# Patient Record
Sex: MALE | Race: WHITE | NOT HISPANIC OR LATINO | Employment: FULL TIME | URBAN - NONMETROPOLITAN AREA
[De-identification: names, ages, dates, MRNs, and addresses within clinical notes are randomized per-mention and may not be internally consistent; named-entity substitution may affect disease eponyms.]

---

## 2023-10-06 ENCOUNTER — APPOINTMENT (OUTPATIENT)
Dept: RADIOLOGY | Facility: HOSPITAL | Age: 53
End: 2023-10-06
Payer: COMMERCIAL

## 2023-10-06 ENCOUNTER — HOSPITAL ENCOUNTER (EMERGENCY)
Facility: HOSPITAL | Age: 53
Discharge: HOME | End: 2023-10-06
Attending: PHYSICIAN ASSISTANT
Payer: COMMERCIAL

## 2023-10-06 VITALS
HEART RATE: 72 BPM | SYSTOLIC BLOOD PRESSURE: 109 MMHG | TEMPERATURE: 96.5 F | DIASTOLIC BLOOD PRESSURE: 77 MMHG | BODY MASS INDEX: 25.18 KG/M2 | RESPIRATION RATE: 16 BRPM | WEIGHT: 190 LBS | HEIGHT: 73 IN | OXYGEN SATURATION: 98 %

## 2023-10-06 DIAGNOSIS — S93.402A SPRAIN OF LEFT ANKLE, UNSPECIFIED LIGAMENT, INITIAL ENCOUNTER: ICD-10-CM

## 2023-10-06 DIAGNOSIS — M25.561 ACUTE PAIN OF RIGHT KNEE: ICD-10-CM

## 2023-10-06 DIAGNOSIS — S93.402A SPRAIN OF LEFT ANKLE, INITIAL ENCOUNTER: Primary | ICD-10-CM

## 2023-10-06 DIAGNOSIS — S50.02XA CONTUSION OF LEFT ELBOW, INITIAL ENCOUNTER: ICD-10-CM

## 2023-10-06 PROCEDURE — 73562 X-RAY EXAM OF KNEE 3: CPT | Mod: RIGHT SIDE | Performed by: RADIOLOGY

## 2023-10-06 PROCEDURE — 99284 EMERGENCY DEPT VISIT MOD MDM: CPT | Mod: 25 | Performed by: PHYSICIAN ASSISTANT

## 2023-10-06 PROCEDURE — 73610 X-RAY EXAM OF ANKLE: CPT | Mod: RT,FY

## 2023-10-06 PROCEDURE — 73610 X-RAY EXAM OF ANKLE: CPT | Mod: RIGHT SIDE | Performed by: RADIOLOGY

## 2023-10-06 PROCEDURE — 73564 X-RAY EXAM KNEE 4 OR MORE: CPT | Mod: RT,FY

## 2023-10-06 PROCEDURE — 73080 X-RAY EXAM OF ELBOW: CPT | Mod: LEFT SIDE | Performed by: RADIOLOGY

## 2023-10-06 PROCEDURE — 82075 ASSAY OF BREATH ETHANOL: CPT

## 2023-10-06 PROCEDURE — 73080 X-RAY EXAM OF ELBOW: CPT | Mod: LT,FY

## 2023-10-06 RX ORDER — ETODOLAC 300 MG/1
300 CAPSULE ORAL 3 TIMES DAILY PRN
Qty: 30 CAPSULE | Refills: 0 | Status: SHIPPED | OUTPATIENT
Start: 2023-10-06 | End: 2023-10-16

## 2023-10-06 ASSESSMENT — COLUMBIA-SUICIDE SEVERITY RATING SCALE - C-SSRS
1. IN THE PAST MONTH, HAVE YOU WISHED YOU WERE DEAD OR WISHED YOU COULD GO TO SLEEP AND NOT WAKE UP?: NO
2. HAVE YOU ACTUALLY HAD ANY THOUGHTS OF KILLING YOURSELF?: NO
6. HAVE YOU EVER DONE ANYTHING, STARTED TO DO ANYTHING, OR PREPARED TO DO ANYTHING TO END YOUR LIFE?: NO

## 2023-10-06 ASSESSMENT — PAIN SCALES - GENERAL: PAINLEVEL_OUTOF10: 7

## 2023-10-06 ASSESSMENT — PAIN - FUNCTIONAL ASSESSMENT: PAIN_FUNCTIONAL_ASSESSMENT: 0-10

## 2023-10-06 NOTE — ED TRIAGE NOTES
Fell 2 feet backwards off of pallets at work, injuring left elbow, right knee, and right ankle. This incident happened at 0900 this morning, pt states he iced it and went back to work; pt reports no pain is worse

## 2023-10-06 NOTE — Clinical Note
Anibal Person was seen and treated in our emergency department on 10/6/2023.  He may return to work on 10/09/2023.       If you have any questions or concerns, please don't hesitate to call.      Noemí Hanley PA-C

## 2023-10-06 NOTE — ED PROVIDER NOTES
HPI   Chief Complaint   Patient presents with    Ankle Pain     right    Knee Pain     right    Elbow Pain     left       53-year-old male was at work today when he slipped off a pallet falling about 2 feet with right knee right ankle and left elbow pain    Did not happened about 9 AM this morning  He did work the rest of the day but with increasing pain to the areas after this                                No data recorded                Patient History   Past Medical History:   Diagnosis Date    Other hammer toe(s) (acquired), unspecified foot     Hammer toe     Past Surgical History:   Procedure Laterality Date    FOOT SURGERY  07/12/2013    Foot Surgery    FOOT SURGERY  07/12/2013    Foot Surgery    TYMPANOSTOMY TUBE PLACEMENT  07/12/2013    Ear Pressure Equalization Tube     No family history on file.  Social History     Tobacco Use    Smoking status: Not on file    Smokeless tobacco: Not on file   Substance Use Topics    Alcohol use: Not on file    Drug use: Not on file       Physical Exam   ED Triage Vitals [10/06/23 1538]   Temp Heart Rate Resp BP   35.8 °C (96.5 °F) 72 16 109/77      SpO2 Temp src Heart Rate Source Patient Position   98 % -- -- --      BP Location FiO2 (%)     -- --       Physical Exam  Vitals and nursing note reviewed.   Constitutional:       General: He is not in acute distress.     Appearance: He is well-developed.   HENT:      Head: Normocephalic and atraumatic.   Eyes:      Conjunctiva/sclera: Conjunctivae normal.   Cardiovascular:      Rate and Rhythm: Normal rate and regular rhythm.      Heart sounds: No murmur heard.  Pulmonary:      Effort: Pulmonary effort is normal. No respiratory distress.      Breath sounds: Normal breath sounds.   Abdominal:      Palpations: Abdomen is soft.      Tenderness: There is no abdominal tenderness.   Musculoskeletal:         General: No swelling.      Cervical back: Neck supple.      Comments: Right ankle pain with palp but no swelling or  ecchymosis  Anterior knee pain but no swelling or ecchymosis  But tenderness with flexion or extension and no joint laxity    Left elbow medial epicondyles slight tenderness  But no ecchymosis or swelling   Skin:     General: Skin is warm and dry.      Capillary Refill: Capillary refill takes less than 2 seconds.   Neurological:      Mental Status: He is alert.   Psychiatric:         Mood and Affect: Mood normal.         ED Course & MDM   Diagnoses as of 10/06/23 1757   Sprain of left ankle, initial encounter   Acute pain of right knee   Contusion of left elbow, initial encounter       Medical Decision Making  X-ray of the right ankle and knee no fractures or dislocation x-ray of the left elbow no fractures or dislocations no fat pad sign    Exam no swelling or ecchymosis to the areas Ace wrap applied to the right ankle will discharge on anti-inflammatory medications          Procedure  Procedures     Noemí Hanley PA-C  10/06/23 1800       Noemí Hanley PA-C  10/06/23 1928

## 2023-10-06 NOTE — DISCHARGE INSTRUCTIONS
X-rays of the right ankle knee and left elbow are negative for any fractures  right ankle sprain rest ice compression and elevation

## 2025-01-09 ENCOUNTER — APPOINTMENT (OUTPATIENT)
Dept: RADIOLOGY | Facility: HOSPITAL | Age: 55
End: 2025-01-09
Payer: COMMERCIAL

## 2025-01-09 ENCOUNTER — HOSPITAL ENCOUNTER (EMERGENCY)
Facility: HOSPITAL | Age: 55
Discharge: HOME | End: 2025-01-09
Attending: EMERGENCY MEDICINE
Payer: COMMERCIAL

## 2025-01-09 VITALS
OXYGEN SATURATION: 100 % | SYSTOLIC BLOOD PRESSURE: 145 MMHG | DIASTOLIC BLOOD PRESSURE: 92 MMHG | BODY MASS INDEX: 27.83 KG/M2 | WEIGHT: 210 LBS | HEART RATE: 78 BPM | RESPIRATION RATE: 16 BRPM | TEMPERATURE: 97.4 F | HEIGHT: 73 IN

## 2025-01-09 DIAGNOSIS — S60.419A ABRASION OF FINGER, INITIAL ENCOUNTER: Primary | ICD-10-CM

## 2025-01-09 DIAGNOSIS — L03.012 CELLULITIS OF FINGER OF LEFT HAND: ICD-10-CM

## 2025-01-09 PROCEDURE — 2500000004 HC RX 250 GENERAL PHARMACY W/ HCPCS (ALT 636 FOR OP/ED): Performed by: EMERGENCY MEDICINE

## 2025-01-09 PROCEDURE — 99284 EMERGENCY DEPT VISIT MOD MDM: CPT | Performed by: EMERGENCY MEDICINE

## 2025-01-09 PROCEDURE — 73130 X-RAY EXAM OF HAND: CPT | Mod: LT

## 2025-01-09 PROCEDURE — 99283 EMERGENCY DEPT VISIT LOW MDM: CPT | Mod: 25 | Performed by: EMERGENCY MEDICINE

## 2025-01-09 PROCEDURE — 90715 TDAP VACCINE 7 YRS/> IM: CPT | Performed by: EMERGENCY MEDICINE

## 2025-01-09 PROCEDURE — 2500000001 HC RX 250 WO HCPCS SELF ADMINISTERED DRUGS (ALT 637 FOR MEDICARE OP): Performed by: EMERGENCY MEDICINE

## 2025-01-09 PROCEDURE — 90471 IMMUNIZATION ADMIN: CPT | Performed by: EMERGENCY MEDICINE

## 2025-01-09 PROCEDURE — 73130 X-RAY EXAM OF HAND: CPT | Mod: LEFT SIDE | Performed by: RADIOLOGY

## 2025-01-09 RX ORDER — DOXYCYCLINE 100 MG/1
100 TABLET ORAL 2 TIMES DAILY
Qty: 14 TABLET | Refills: 0 | Status: SHIPPED | OUTPATIENT
Start: 2025-01-09 | End: 2025-01-16

## 2025-01-09 RX ORDER — DOXYCYCLINE HYCLATE 50 MG/1
100 CAPSULE, GELATIN COATED ORAL ONCE
Status: COMPLETED | OUTPATIENT
Start: 2025-01-09 | End: 2025-01-09

## 2025-01-09 RX ADMIN — DOXYCYCLINE HYCLATE 100 MG: 50 CAPSULE ORAL at 03:07

## 2025-01-09 RX ADMIN — TETANUS TOXOID, REDUCED DIPHTHERIA TOXOID AND ACELLULAR PERTUSSIS VACCINE, ADSORBED 0.5 ML: 5; 2.5; 8; 8; 2.5 SUSPENSION INTRAMUSCULAR at 03:07

## 2025-01-09 ASSESSMENT — PAIN DESCRIPTION - ORIENTATION: ORIENTATION: LEFT

## 2025-01-09 ASSESSMENT — PAIN DESCRIPTION - LOCATION: LOCATION: HAND

## 2025-01-09 ASSESSMENT — PAIN SCALES - GENERAL: PAINLEVEL_OUTOF10: 9

## 2025-01-09 ASSESSMENT — PAIN - FUNCTIONAL ASSESSMENT: PAIN_FUNCTIONAL_ASSESSMENT: 0-10

## 2025-01-09 ASSESSMENT — PAIN DESCRIPTION - PAIN TYPE: TYPE: ACUTE PAIN

## 2025-01-09 NOTE — ED PROVIDER NOTES
HPI   Chief Complaint   Patient presents with    Hand Pain     Left ring finger injury       HPI  Patient is a 54-year-old male presenting to the ED today for an injury to his left ring finger.  Patient explains that he was at work yesterday when his left ring finger got smacked by a piece of wood sticking out of the pallet.  He states that this occurred around 4 PM yesterday. Since then, he has noticed worsening pain and swelling of the finger, with some associated redness, prompting him to come to the ED for further management.  He states his last tetanus shot was over 8 years ago. He has no additional concerns.      Patient History   Past Medical History:   Diagnosis Date    Other hammer toe(s) (acquired), unspecified foot     Hammer toe     Past Surgical History:   Procedure Laterality Date    FOOT SURGERY  07/12/2013    Foot Surgery    FOOT SURGERY  07/12/2013    Foot Surgery    TYMPANOSTOMY TUBE PLACEMENT  07/12/2013    Ear Pressure Equalization Tube     No family history on file.  Social History     Tobacco Use    Smoking status: Never    Smokeless tobacco: Never   Vaping Use    Vaping status: Every Day   Substance Use Topics    Alcohol use: Never    Drug use: Never       Physical Exam   ED Triage Vitals [01/09/25 0145]   Temperature Heart Rate Respirations BP   36.3 °C (97.4 °F) 80 20 (!) 150/102      Pulse Ox Temp Source Heart Rate Source Patient Position   99 % Temporal -- --      BP Location FiO2 (%)     -- --       Physical Exam  Vitals and nursing note reviewed.   Constitutional:       General: He is not in acute distress.     Appearance: He is not toxic-appearing.   HENT:      Head: Normocephalic.   Eyes:      Conjunctiva/sclera: Conjunctivae normal.   Cardiovascular:      Pulses: Normal pulses.   Pulmonary:      Effort: Pulmonary effort is normal. No respiratory distress.   Musculoskeletal:      Cervical back: Neck supple.      Comments: Superficial abrasion to the dorsal aspect of the left ring  finger.  There is mild surrounding erythema and swelling.  Finger is held in extension.  He is able to flex the finger, but with exacerbation of pain.  No swelling over the volar aspect.   Skin:     General: Skin is warm and dry.      Capillary Refill: Capillary refill takes less than 2 seconds.   Neurological:      General: No focal deficit present.      Mental Status: He is alert. Mental status is at baseline.           ED Course & MDM   Diagnoses as of 01/09/25 0309   Abrasion of finger, initial encounter   Cellulitis of finger of left hand             No data recorded     Ira Coma Scale Score: 15 (01/09/25 0147 : Ely Parker RN)                       Medical Decision Making  Patient was seen and evaluated for an injury to his left ring finger.  X-ray is ordered for further evaluation.  Patient is also administered Tdap here in the ED.  On exam, patient has mild swelling and erythema over the dorsal aspect where there is an abrasion present.  He otherwise has negative Kanavel signs, and exam is not consistent with flexor tenosynovitis.     XR hand left 3+ views   Final Result   Unremarkable hand.   Signed by Melchor Causey DO        Patient was informed of their imaging results, and all questions and concerns were answered.  Patient will be started on doxycycline for cellulitis/further infection prevention.  Discharge planning with close outpatient follow-up was discussed at this time, to which the patient was agreeable. Strict return precautions were given, and patient was discharged home in stable condition.    Procedure  Procedures     Ellie MITCHELL MD  01/09/25 7947

## 2025-04-03 ENCOUNTER — TELEPHONE (OUTPATIENT)
Dept: SURGERY | Facility: CLINIC | Age: 55
End: 2025-04-03
Payer: COMMERCIAL

## 2025-04-03 NOTE — TELEPHONE ENCOUNTER
spoke with Pt. and made an appointment for Pt. Pt. is aware that this would not be under workmans comp. it would only by ubder his [employer] insurance

## 2025-04-11 ENCOUNTER — APPOINTMENT (OUTPATIENT)
Dept: SURGERY | Facility: CLINIC | Age: 55
End: 2025-04-11
Payer: COMMERCIAL

## 2025-04-11 VITALS
WEIGHT: 195 LBS | DIASTOLIC BLOOD PRESSURE: 89 MMHG | SYSTOLIC BLOOD PRESSURE: 147 MMHG | HEIGHT: 73 IN | TEMPERATURE: 98.8 F | BODY MASS INDEX: 25.84 KG/M2 | HEART RATE: 70 BPM

## 2025-04-11 DIAGNOSIS — K40.90 RIGHT INGUINAL HERNIA: Primary | ICD-10-CM

## 2025-04-11 PROCEDURE — 1036F TOBACCO NON-USER: CPT | Performed by: SURGERY

## 2025-04-11 PROCEDURE — 3008F BODY MASS INDEX DOCD: CPT | Performed by: SURGERY

## 2025-04-11 PROCEDURE — 99204 OFFICE O/P NEW MOD 45 MIN: CPT | Performed by: SURGERY

## 2025-04-11 RX ORDER — ACETAMINOPHEN 325 MG/1
975 TABLET ORAL ONCE
OUTPATIENT
Start: 2025-04-11 | End: 2025-04-11

## 2025-04-11 RX ORDER — CELECOXIB 50 MG/1
200 CAPSULE ORAL ONCE
OUTPATIENT
Start: 2025-04-11 | End: 2025-04-11

## 2025-04-11 NOTE — PATIENT INSTRUCTIONS
You have a symptomatic right groin bulge.  We will repair your hernia for you on April 29, 2025.  My office will provide you preprocedure instructions.  You have been enrolled in the abdominal core health quality collaborative and the no opioids trial

## 2025-04-11 NOTE — PROGRESS NOTES
Subjective   Patient ID: Anibal Person is a 54 y.o. male who presents for symptomatic right groin bulge    HPI   This patient has had a groin bulge on the right side for the past 2 years.  Is been increasing in size.  He has pain and discomfort with a bulge.  He is able to push it back in.  He wishes to have this repaired based on the increasing symptoms  Past Medical History:   Diagnosis Date    Other hammer toe(s) (acquired), unspecified foot     Hammer toe        No current outpatient medications on file prior to visit.     No current facility-administered medications on file prior to visit.        Review of Systems   All other systems reviewed and are negative.      Vitals:    04/11/25 0807   BP: 147/89   Pulse: 70   Temp: 37.1 °C (98.8 °F)        Objective     Physical Exam  Vitals reviewed.   Constitutional:       Appearance: Normal appearance.   HENT:      Head: Normocephalic.   Cardiovascular:      Rate and Rhythm: Normal rate and regular rhythm.      Heart sounds: Normal heart sounds.   Pulmonary:      Effort: Pulmonary effort is normal.      Breath sounds: Normal breath sounds.   Abdominal:      General: Abdomen is flat. There is no distension.      Palpations: Abdomen is soft. There is no mass.      Tenderness: There is no abdominal tenderness. There is no guarding.      Hernia: A hernia is present. Hernia is present in the right inguinal area.      Comments: Reducible right inguinal hernia   Musculoskeletal:         General: Normal range of motion.   Skin:     General: Skin is warm.   Neurological:      General: No focal deficit present.   Psychiatric:         Mood and Affect: Mood normal.         Problem List Items Addressed This Visit       Right inguinal hernia - Primary        Assessment/Plan   RIGHT INGUINAL HERNIORRAPHY 4.29.25.  Risks include, but not limited to acute and chronic pain,  infection,bleeding, risk of recurrence, risk of numbness, risk of cardiac, renal, pulmonary, neurologic,  locomotor,  anesthetic event, risk of bowel injury, other unforeseen complications including death.    Patient enrolled in the Abdominal Core Health Quality Collaborative /NOOPIOIDS trial     Jose Ivy MD

## 2025-04-14 ENCOUNTER — ANESTHESIA EVENT (OUTPATIENT)
Dept: OPERATING ROOM | Facility: HOSPITAL | Age: 55
End: 2025-04-14
Payer: COMMERCIAL

## 2025-04-14 NOTE — ANESTHESIA PREPROCEDURE EVALUATION
"Patient: Anibal Person    Procedure Information       Date/Time: 04/29/25 1130    Procedure: REPAIR, HERNIA, INGUINAL (Right)    Location: [facility] / [facility]    Surgeons: Jose Ivy MD            Relevant Problems   Anesthesia (within normal limits)      Cardiac (within normal limits)      Pulmonary (within normal limits)      Neuro (within normal limits)      GI (within normal limits)      /Renal (within normal limits)      Liver (within normal limits)      Endocrine (within normal limits)      Hematology (within normal limits)      Musculoskeletal (within normal limits)      HEENT (within normal limits)      ID (within normal limits)      Skin (within normal limits)      GYN (within normal limits)      Digestive   (+) Right inguinal hernia     There were no vitals filed for this visit.    Past Surgical History:   Procedure Laterality Date    FOOT SURGERY  07/12/2013    Foot Surgery    FOOT SURGERY  07/12/2013    Foot Surgery    TYMPANOSTOMY TUBE PLACEMENT  07/12/2013    Ear Pressure Equalization Tube     Past Medical History:   Diagnosis Date    Other hammer toe(s) (acquired), unspecified foot     Hammer toe     No current facility-administered medications for this encounter.  No current outpatient medications on file.  Prior to Admission medications    Not on File     Allergies   Allergen Reactions    Penicillins Unknown     Social History     Tobacco Use    Smoking status: Never    Smokeless tobacco: Never   Substance Use Topics    Alcohol use: Never         Chemistry    No results found for: \"NA\", \"K\", \"CL\", \"CO2\", \"BUN\", \"CREATININE\", \"GLU\" No results found for: \"CALCIUM\", \"ALKPHOS\", \"AST\", \"ALT\", \"BILITOT\"       No results found for: \"WBC\", \"HGB\", \"HCT\", \"PLT\"  No results found for: \"PROTIME\", \"PTT\", \"INR\"  No results found for this or any previous visit (from the past 4464 hours).  No results found for this or any previous visit from the past 1095 days.    Clinical information reviewed:      "            Chart reviewed.  No clearances ordered.    NPO Detail:  No data recorded     Physical Exam    Airway  Mallampati: I  TM distance: >3 FB  Neck ROM: full  Mouth opening: 3 or more finger widths     Cardiovascular - normal exam  Rhythm: regular     Dental - normal exam     Pulmonary - normal examBreath sounds clear to auscultation     Abdominal - normal examAbdomen: soft  Bowel sounds: normal           Anesthesia Plan    History of general anesthesia?: yes  History of complications of general anesthesia?: no    ASA 2     general     The patient is a current smoker.  Patient was previously instructed to abstain from smoking on day of procedure.  Patient smoked on day of procedure.    intravenous induction   Postoperative pain plan includes opioids.  Trial extubation is planned.  Anesthetic plan and risks discussed with patient.  Use of blood products discussed with patient who consented to blood products.    Plan discussed with CRNA.

## 2025-04-15 ENCOUNTER — PRE-ADMISSION TESTING (OUTPATIENT)
Dept: PREADMISSION TESTING | Facility: HOSPITAL | Age: 55
End: 2025-04-15
Payer: COMMERCIAL

## 2025-04-15 DIAGNOSIS — Z29.9 PROPHYLACTIC MEASURE: Primary | ICD-10-CM

## 2025-04-15 PROCEDURE — 87081 CULTURE SCREEN ONLY: CPT | Mod: GENLAB

## 2025-04-15 RX ORDER — CHLORHEXIDINE GLUCONATE 40 MG/ML
SOLUTION TOPICAL
Qty: 473 ML | Refills: 0 | Status: SHIPPED | OUTPATIENT
Start: 2025-04-15

## 2025-04-15 RX ORDER — CHLORHEXIDINE GLUCONATE ORAL RINSE 1.2 MG/ML
SOLUTION DENTAL
Qty: 120 ML | Refills: 0 | Status: SHIPPED | OUTPATIENT
Start: 2025-04-15

## 2025-04-15 ASSESSMENT — DUKE ACTIVITY SCORE INDEX (DASI)
CAN YOU DO YARD WORK LIKE RAKING LEAVES, WEEDING OR PUSHING A MOWER: YES
TOTAL_SCORE: 58.2
CAN YOU DO HEAVY WORK AROUND THE HOUSE LIKE SCRUBBING FLOORS OR LIFTING AND MOVING HEAVY FURNITURE: YES
CAN YOU WALK A BLOCK OR TWO ON LEVEL GROUND: YES
DASI METS SCORE: 9.9
CAN YOU CLIMB A FLIGHT OF STAIRS OR WALK UP A HILL: YES
CAN YOU TAKE CARE OF YOURSELF (EAT, DRESS, BATHE, OR USE TOILET): YES
CAN YOU RUN A SHORT DISTANCE: YES
CAN YOU WALK INDOORS, SUCH AS AROUND YOUR HOUSE: YES
CAN YOU PARTICIPATE IN STRENOUS SPORTS LIKE SWIMMING, SINGLES TENNIS, FOOTBALL, BASKETBALL, OR SKIING: YES
CAN YOU DO LIGHT WORK AROUND THE HOUSE LIKE DUSTING OR WASHING DISHES: YES
CAN YOU DO MODERATE WORK AROUND THE HOUSE LIKE VACUUMING, SWEEPING FLOORS OR CARRYING GROCERIES: YES
CAN YOU HAVE SEXUAL RELATIONS: YES
CAN YOU PARTICIPATE IN MODERATE RECREATIONAL ACTIVITIES LIKE GOLF, BOWLING, DANCING, DOUBLES TENNIS OR THROWING A BASEBALL OR FOOTBALL: YES

## 2025-04-15 NOTE — PREPROCEDURE INSTRUCTIONS
Medication List      as of April 15, 2025  7:17 AM     You have not been prescribed any medications.         NPO Instructions:    NO SOLID FOOD OR DAIRY PRODUCTS  after midnight the night prior to your procedure-ONLY CLEAR LIQUIDS     Examples of clear liquids permitted the day before your procedure: black coffee or coffee with sugar, broth, popsicles, Gatorade or Powerade, Italian ice, Jello, crystal light drink mixes, or apple juice with no pulp (no citrus juices)     You may have clear liquids, medications, gum and hard candy up to THREE HOURS prior to your procedure- General rule is to not put ANYTHING AT ALL in your mouth after this time     Additional Instructions:     We will call you the business day before your procedure between 1-3p to confirm your procedure and arrival time     Please park in the back of the hospital, in the ED parking and proceed to the second floor. This is through the ED waiting room and take the elevator to the second floor. When you get off the elevator you will check in the OUTPATIENT department on the left.     Please keep in mind the construction on [address]     Please make arrangements to have a responsible adult take you home and stay with you. NO DRIVING FOR 24 HOURS.     Please bring your ID and insurance card to your procedure     It is anesthesia's policy to not use any marijuana, CBD or THC products 24 hours prior to procedure. This is due to not knowing how the products will react with anesthesia.     If you get ill at all before your procedure- CALL YOUR DOCTOR/SURGEON. We want you in the best shape that is possible. Any sickness might lead to your procedure being delayed.     Please shower or bathe the morning of your procedure with antibacterial soap. You may receive instructions for Hibiclens shower.     Shower as you normally would do     Use soap from neck down, focusing on area that is having the surgery (avoid eyes and genitals)     Turn water off and let dry  for three minutes.     Turn water back on and rinse off     When checked in to the outpatient unit, you will be asked to change into a hospital gown and remove all clothing, including underwear     An IV will be inserted      Your family or  will be asked to wait in the waiting room or cafeteria and will be notified when able to come sit with you     Typical recovery time will be aprox 60 minutes in PACU. Please let us know if you are having pain or nausea so we can assist you.     Please call [phone] with any further questions

## 2025-04-17 LAB — STAPHYLOCOCCUS SPEC CULT: NORMAL

## 2025-04-29 ENCOUNTER — HOSPITAL ENCOUNTER (OUTPATIENT)
Facility: HOSPITAL | Age: 55
Setting detail: OUTPATIENT SURGERY
Discharge: HOME | End: 2025-04-29
Attending: SURGERY | Admitting: SURGERY
Payer: COMMERCIAL

## 2025-04-29 ENCOUNTER — ANESTHESIA (OUTPATIENT)
Dept: OPERATING ROOM | Facility: HOSPITAL | Age: 55
End: 2025-04-29
Payer: COMMERCIAL

## 2025-04-29 VITALS
SYSTOLIC BLOOD PRESSURE: 134 MMHG | TEMPERATURE: 98.4 F | WEIGHT: 195 LBS | HEIGHT: 73 IN | RESPIRATION RATE: 16 BRPM | OXYGEN SATURATION: 99 % | BODY MASS INDEX: 25.84 KG/M2 | DIASTOLIC BLOOD PRESSURE: 86 MMHG | HEART RATE: 60 BPM

## 2025-04-29 DIAGNOSIS — K40.90 RIGHT INGUINAL HERNIA: Primary | ICD-10-CM

## 2025-04-29 PROCEDURE — 2500000004 HC RX 250 GENERAL PHARMACY W/ HCPCS (ALT 636 FOR OP/ED): Performed by: SURGERY

## 2025-04-29 PROCEDURE — 3600000003 HC OR TIME - INITIAL BASE CHARGE - PROCEDURE LEVEL THREE: Performed by: SURGERY

## 2025-04-29 PROCEDURE — 2780000003 HC OR 278 NO HCPCS: Performed by: SURGERY

## 2025-04-29 PROCEDURE — 49505 PRP I/HERN INIT REDUC >5 YR: CPT | Performed by: SURGERY

## 2025-04-29 PROCEDURE — 7100000001 HC RECOVERY ROOM TIME - INITIAL BASE CHARGE: Performed by: SURGERY

## 2025-04-29 PROCEDURE — 2500000005 HC RX 250 GENERAL PHARMACY W/O HCPCS: Performed by: PHYSICIAN ASSISTANT

## 2025-04-29 PROCEDURE — 7100000010 HC PHASE TWO TIME - EACH INCREMENTAL 1 MINUTE: Performed by: SURGERY

## 2025-04-29 PROCEDURE — 7100000002 HC RECOVERY ROOM TIME - EACH INCREMENTAL 1 MINUTE: Performed by: SURGERY

## 2025-04-29 PROCEDURE — 3700000002 HC GENERAL ANESTHESIA TIME - EACH INCREMENTAL 1 MINUTE: Performed by: SURGERY

## 2025-04-29 PROCEDURE — 3700000001 HC GENERAL ANESTHESIA TIME - INITIAL BASE CHARGE: Performed by: SURGERY

## 2025-04-29 PROCEDURE — 2500000004 HC RX 250 GENERAL PHARMACY W/ HCPCS (ALT 636 FOR OP/ED): Mod: JZ

## 2025-04-29 PROCEDURE — 2720000007 HC OR 272 NO HCPCS: Performed by: SURGERY

## 2025-04-29 PROCEDURE — 7100000009 HC PHASE TWO TIME - INITIAL BASE CHARGE: Performed by: SURGERY

## 2025-04-29 PROCEDURE — 2500000001 HC RX 250 WO HCPCS SELF ADMINISTERED DRUGS (ALT 637 FOR MEDICARE OP): Performed by: SURGERY

## 2025-04-29 PROCEDURE — 3600000008 HC OR TIME - EACH INCREMENTAL 1 MINUTE - PROCEDURE LEVEL THREE: Performed by: SURGERY

## 2025-04-29 PROCEDURE — C1781 MESH (IMPLANTABLE): HCPCS | Performed by: SURGERY

## 2025-04-29 PROCEDURE — 88302 TISSUE EXAM BY PATHOLOGIST: CPT | Mod: TC,GENLAB | Performed by: SURGERY

## 2025-04-29 DEVICE — BARD SOFT MESH, 3" X 6" (7.5 CM X 15 CM)
Type: IMPLANTABLE DEVICE | Site: GROIN | Status: FUNCTIONAL
Brand: BARD

## 2025-04-29 RX ORDER — ONDANSETRON HYDROCHLORIDE 2 MG/ML
INJECTION, SOLUTION INTRAVENOUS AS NEEDED
Status: DISCONTINUED | OUTPATIENT
Start: 2025-04-29 | End: 2025-04-29

## 2025-04-29 RX ORDER — IBUPROFEN 800 MG/1
800 TABLET ORAL EVERY 8 HOURS PRN
Qty: 9 TABLET | Refills: 0 | Status: SHIPPED | OUTPATIENT
Start: 2025-04-29 | End: 2025-05-02

## 2025-04-29 RX ORDER — BUPIVACAINE HYDROCHLORIDE 5 MG/ML
INJECTION, SOLUTION EPIDURAL; INTRACAUDAL; PERINEURAL AS NEEDED
Status: DISCONTINUED | OUTPATIENT
Start: 2025-04-29 | End: 2025-04-29 | Stop reason: HOSPADM

## 2025-04-29 RX ORDER — LIDOCAINE HYDROCHLORIDE 20 MG/ML
INJECTION, SOLUTION INFILTRATION; PERINEURAL AS NEEDED
Status: DISCONTINUED | OUTPATIENT
Start: 2025-04-29 | End: 2025-04-29

## 2025-04-29 RX ORDER — ACETAMINOPHEN 500 MG
1000 TABLET ORAL EVERY 8 HOURS PRN
Qty: 18 TABLET | Refills: 0 | Status: SHIPPED | OUTPATIENT
Start: 2025-04-29 | End: 2025-05-02

## 2025-04-29 RX ORDER — CELECOXIB 100 MG/1
200 CAPSULE ORAL ONCE
Status: COMPLETED | OUTPATIENT
Start: 2025-04-29 | End: 2025-04-29

## 2025-04-29 RX ORDER — ONDANSETRON HYDROCHLORIDE 2 MG/ML
4 INJECTION, SOLUTION INTRAVENOUS ONCE AS NEEDED
Status: DISCONTINUED | OUTPATIENT
Start: 2025-04-29 | End: 2025-04-29 | Stop reason: HOSPADM

## 2025-04-29 RX ORDER — KETOROLAC TROMETHAMINE 30 MG/ML
INJECTION, SOLUTION INTRAMUSCULAR; INTRAVENOUS AS NEEDED
Status: DISCONTINUED | OUTPATIENT
Start: 2025-04-29 | End: 2025-04-29

## 2025-04-29 RX ORDER — LIDOCAINE HYDROCHLORIDE 10 MG/ML
INJECTION, SOLUTION INFILTRATION; PERINEURAL AS NEEDED
Status: DISCONTINUED | OUTPATIENT
Start: 2025-04-29 | End: 2025-04-29 | Stop reason: HOSPADM

## 2025-04-29 RX ORDER — FENTANYL CITRATE 50 UG/ML
25 INJECTION, SOLUTION INTRAMUSCULAR; INTRAVENOUS EVERY 5 MIN PRN
Status: DISCONTINUED | OUTPATIENT
Start: 2025-04-29 | End: 2025-04-29 | Stop reason: HOSPADM

## 2025-04-29 RX ORDER — CLINDAMYCIN PHOSPHATE 600 MG/50ML
600 INJECTION, SOLUTION INTRAVENOUS ONCE
Status: COMPLETED | OUTPATIENT
Start: 2025-04-29 | End: 2025-04-29

## 2025-04-29 RX ORDER — ACETAMINOPHEN 325 MG/1
650 TABLET ORAL EVERY 4 HOURS PRN
Status: DISCONTINUED | OUTPATIENT
Start: 2025-04-29 | End: 2025-04-29 | Stop reason: HOSPADM

## 2025-04-29 RX ORDER — PROPOFOL 10 MG/ML
INJECTION, EMULSION INTRAVENOUS AS NEEDED
Status: DISCONTINUED | OUTPATIENT
Start: 2025-04-29 | End: 2025-04-29

## 2025-04-29 RX ORDER — ACETAMINOPHEN 325 MG/1
975 TABLET ORAL ONCE
Status: COMPLETED | OUTPATIENT
Start: 2025-04-29 | End: 2025-04-29

## 2025-04-29 RX ORDER — FENTANYL CITRATE 50 UG/ML
INJECTION, SOLUTION INTRAMUSCULAR; INTRAVENOUS AS NEEDED
Status: DISCONTINUED | OUTPATIENT
Start: 2025-04-29 | End: 2025-04-29

## 2025-04-29 RX ORDER — OXYCODONE HYDROCHLORIDE 5 MG/1
5 TABLET ORAL EVERY 4 HOURS PRN
Status: DISCONTINUED | OUTPATIENT
Start: 2025-04-29 | End: 2025-04-29 | Stop reason: HOSPADM

## 2025-04-29 RX ORDER — SODIUM CHLORIDE, SODIUM LACTATE, POTASSIUM CHLORIDE, CALCIUM CHLORIDE 600; 310; 30; 20 MG/100ML; MG/100ML; MG/100ML; MG/100ML
INJECTION, SOLUTION INTRAVENOUS CONTINUOUS PRN
Status: DISCONTINUED | OUTPATIENT
Start: 2025-04-29 | End: 2025-04-29

## 2025-04-29 RX ORDER — MIDAZOLAM HYDROCHLORIDE 1 MG/ML
INJECTION INTRAMUSCULAR; INTRAVENOUS AS NEEDED
Status: DISCONTINUED | OUTPATIENT
Start: 2025-04-29 | End: 2025-04-29

## 2025-04-29 RX ADMIN — LIDOCAINE HYDROCHLORIDE 80 MG: 20 INJECTION, SOLUTION INFILTRATION; PERINEURAL at 10:19

## 2025-04-29 RX ADMIN — ACETAMINOPHEN 975 MG: 325 TABLET, FILM COATED ORAL at 09:20

## 2025-04-29 RX ADMIN — FENTANYL CITRATE 50 MCG: 50 INJECTION, SOLUTION INTRAMUSCULAR; INTRAVENOUS at 10:27

## 2025-04-29 RX ADMIN — CELECOXIB 200 MG: 100 CAPSULE ORAL at 09:20

## 2025-04-29 RX ADMIN — POVIDONE-IODINE 1 APPLICATION: 5 SOLUTION TOPICAL at 10:02

## 2025-04-29 RX ADMIN — ONDANSETRON 4 MG: 2 INJECTION, SOLUTION INTRAMUSCULAR; INTRAVENOUS at 10:35

## 2025-04-29 RX ADMIN — KETOROLAC TROMETHAMINE 15 MG: 30 INJECTION, SOLUTION INTRAMUSCULAR; INTRAVENOUS at 10:35

## 2025-04-29 RX ADMIN — DEXAMETHASONE SODIUM PHOSPHATE 4 MG: 4 INJECTION, SOLUTION INTRAMUSCULAR; INTRAVENOUS at 10:22

## 2025-04-29 RX ADMIN — SODIUM CHLORIDE, POTASSIUM CHLORIDE, SODIUM LACTATE AND CALCIUM CHLORIDE: 600; 310; 30; 20 INJECTION, SOLUTION INTRAVENOUS at 10:14

## 2025-04-29 RX ADMIN — FENTANYL CITRATE 50 MCG: 50 INJECTION, SOLUTION INTRAMUSCULAR; INTRAVENOUS at 11:30

## 2025-04-29 RX ADMIN — MIDAZOLAM HYDROCHLORIDE 2 MG: 1 INJECTION, SOLUTION INTRAMUSCULAR; INTRAVENOUS at 10:14

## 2025-04-29 RX ADMIN — PROPOFOL 200 MG: 10 INJECTION, EMULSION INTRAVENOUS at 10:19

## 2025-04-29 RX ADMIN — CLINDAMYCIN IN 5 PERCENT DEXTROSE 600 MG: 12 INJECTION, SOLUTION INTRAVENOUS at 10:14

## 2025-04-29 SDOH — HEALTH STABILITY: MENTAL HEALTH: CURRENT SMOKER: 1

## 2025-04-29 ASSESSMENT — PAIN SCALES - GENERAL
PAIN_LEVEL: 0
PAINLEVEL_OUTOF10: 0 - NO PAIN
PAINLEVEL_OUTOF10: 2
PAINLEVEL_OUTOF10: 0 - NO PAIN

## 2025-04-29 ASSESSMENT — PAIN - FUNCTIONAL ASSESSMENT
PAIN_FUNCTIONAL_ASSESSMENT: 0-10

## 2025-04-29 ASSESSMENT — COLUMBIA-SUICIDE SEVERITY RATING SCALE - C-SSRS
6. HAVE YOU EVER DONE ANYTHING, STARTED TO DO ANYTHING, OR PREPARED TO DO ANYTHING TO END YOUR LIFE?: NO
1. IN THE PAST MONTH, HAVE YOU WISHED YOU WERE DEAD OR WISHED YOU COULD GO TO SLEEP AND NOT WAKE UP?: NO
2. HAVE YOU ACTUALLY HAD ANY THOUGHTS OF KILLING YOURSELF?: NO

## 2025-04-29 NOTE — LETTER
April 29, 2025     Patient: Anibal Person   YOB: 1970   Date of Visit: 4/11/2025       To Whom It May Concern:    It is my medical opinion that Anibal Person may return to light duty immediately with the following restrictions: no lifting over 20 lbs until follow up appointment with Dr. Ivy on June 2nd .    If you have any questions or concerns, please don't hesitate to call.         Sincerely,    Maciej Hong PA-C

## 2025-04-29 NOTE — OP NOTE
REPAIR, HERNIA, INGUINAL (R) Operative Note     Date: 2025  OR Location: [facility]    Name: Anibal Person, : 1970, Age: 54 y.o., MRN: [id number], Sex: male    Diagnosis  Pre-op Diagnosis      * Right inguinal hernia [K40.90] Post-op Diagnosis     * Right inguinal hernia [K40.90]     Procedures  REPAIR, HERNIA, INGUINAL  [id number] - CA RPR 1ST INGUN HRNA AGE 5 YRS/> REDUCIBLE      Surgeons      * Jose Ivy - Primary    Resident/Fellow/Other Assistant:  Surgeons and Role:     * Maciej Hong PA-C - Assisting    Staff:   Circulator: Elda Flanagan Person: Mary    Anesthesia Staff: CRNA: EMILY Swartz-CRNA    Procedure Summary  Anesthesia: General  ASA: II  Estimated Blood Loss: 2 mL  Intra-op Medications:   Administrations occurring from 1015 to 1150 on 25:   Medication Name Total Dose   bupivacaine PF (Marcaine) 0.5 % (5 mg/mL) injection 22 mL   lidocaine (Xylocaine) 10 mg/mL (1 %) injection 22 mL   dexAMETHasone (Decadron) injection 4 mg/mL 4 mg   fentaNYL (Sublimaze) injection 50 mcg/mL 50 mcg   ketorolac (Toradol) injection 30 mg 15 mg   lidocaine (Xylocaine) injection 2 % 80 mg   ondansetron (Zofran) 2 mg/mL injection 4 mg   propofol (Diprivan) IV infusion 200 mg              Anesthesia Record               Intraprocedure I/O Totals          Intake    Propofol Drip 0.00 mL    The total shown is the total volume documented since Anesthesia Start was filed.    clindamycin (Cleocin) 600 mg in dextrose 5% IV 50 mL 50.00 mL    Total Intake 50 mL          Specimen:   ID Type Source Tests Collected by Time   1 : HERNIA SAC Tissue HERNIA SAC SURGICAL PATHOLOGY EXAM Jose Ivy MD 2025 1044                 Drains and/or Catheters: * None in log *    Tourniquet Times:         Implants:  Implants       Type Name Action Serial No.      Surgical Mesh Sling Implant MESH, SOFTMESH 3 X 6, FLAT - [id number] Implanted               Findings: Scarred right indirect inguinal hernia with  large hernia sac.  Ilioinguinal nerve iliohypogastric nerves identified preserved.  Vas deferens identified preserved.  Repaired with Bard soft mesh using Robert technique.    Indications: Anibal Person is an 54 y.o. male who is having surgery for Right inguinal hernia [K40.90].  The patient presented for a symptomatic bulge he had an obvious right inguinal hernia.  He wished undergo repair.    The patient was seen in the preoperative area. The risks, benefits, complications, treatment options, non-operative alternatives, expected recovery and outcomes were discussed with the patient. The possibilities of reaction to medication, pulmonary aspiration, injury to surrounding structures, bleeding, recurrent infection, the need for additional procedures, failure to diagnose a condition, and creating a complication requiring transfusion or operation were discussed with the patient. The patient concurred with the proposed plan, giving informed consent.  The site of surgery was properly noted/marked if necessary per policy. The patient has been actively warmed in preoperative area. Preoperative antibiotics have been ordered and given within 1 hours of incision. Venous thrombosis prophylaxis have been ordered including bilateral sequential compression devices    Procedure Details: After obtaining informed consent with the patient discussing all the risks which include but not limited  acute and chronic pain, infection,bleeding, risk of recurrence, risk of numbness, risk of cardiac, renal, pulmonary, neurologic, locomotor, anesthetic event, risk of bowel injury, other unforeseen complications including death, the right groin was prepped and draped in aseptic fashion.  Local anesthetic was instilled.  Incision was made.  Dissection was carried onto the external oblique aponeurosis.  This was opened and the spermatic cord mobilized at the pubic tubercle.  The vas deferens was identified.  The hernia sac was present  there was an indirect hernia.  High dissection of the sac was performed and this was freed all the way up to its base.  The sac was extremely scarred and inflamed and thick-walled.  This was carefully freed from all the surrounding tissue until I reached the base.  The ilioinguinal nerve and  iliohypogastric nerves were identified and preserved.  The sac was clamped at the base transected and tied with a #0 Vicryl suture.  The onlay mesh was then placed 2 cm distal to the pubic tubercle.  This measured 9 x 5 cm.  Inferiorly this was anchored to Poupart's ligament.  At the internal ring this was tied.  A small slit was made in the mesh and this was brought around the other side of the spermatic cord.  This was anchored to the other side of Poupart's ligament.  Lateral aspect of the mesh was then placed under the external oblique aponeurosis and anchored with suture.  Superiorly the mesh was placed under the external oblique aponeurosis over the conjoined tendon.  This was anchored with Vicryl suture.  Medially this was anchored to the tendinous portion of the conjoined tendon with Prolene suture.  The mesh lay nicely with expected laxity.  There was complete hemostasis.  The deep subcutaneous tissues closed with 0 Vicryl suture. The skin was closed with 4 Monocryl suture.  Dressings were placed.  All sponge and instrument counts were correct x 2.  The patient tolerated the procedure well and was discharged to the recovery room in stable condition.  Evidence of Infection: No   Complications:  None; patient tolerated the procedure well.    Disposition: PACU - hemodynamically stable.  Condition: stable     The surgical assistant assisted with positioning, preparation of the surgical site, tissue retraction, suctioning, due to the nature of the case and the difficulty of the case.  The surgical assistant performed a primary closure and dressing application.     Additional Details: Time equals 40 minutes.  Wound  classification 1.  Body mass index 25    Attending Attestation: I performed the procedure.    Jose Ivy  Phone Number: [phone]

## 2025-04-29 NOTE — ANESTHESIA PROCEDURE NOTES
Airway  Date/Time: 4/29/2025 10:20 AM  Reason: elective    Airway not difficult    Staffing  Performed: CRNA   Authorized by: FERN Swartz    Performed by: FERN Swartz  Patient location during procedure: OR    Patient Condition  Indications for airway management: anesthesia and airway protection  Patient position: sniffing  MILS maintained throughout  Planned trial extubation  Sedation level: deep     Final Airway Details   Preoxygenated: yes  Final airway type: supraglottic airway  Successful airway: Supraglottic airway: i-gel.  Size: 5  Number of attempts at approach: 1  Number of other approaches attempted: 0

## 2025-04-29 NOTE — DISCHARGE INSTRUCTIONS
What care is needed at home?  Ask your doctor what you need to do when you go home. Make sure you ask questions if you do not understand what the doctor says. This way you will know what you need to do.  Talk with your doctor about how much you can lift. Your doctor may want you to gradually return to your normal activity or they may restrict how much you can lift for a period of time.  Talk to your doctor about how to care for your cut site. Ask your doctor about:  When you should remove or change your bandages  When you may take a bath or shower. Do not soak in a bath.  When you may go back to your normal activities like work or driving  Be sure to wash your hands before touching your wound or dressing.  You may feel better in loose-fitting clothing.  Place an ice pack or a bag of frozen peas wrapped in a towel over the painful part. Never put ice right on the skin. Do not leave the ice on more than 10 to 15 minutes at a time. This may help with pain and swelling.  What follow-up care is needed?  Your doctor may ask you to make visits to the office to check on your progress. Be sure to keep these visits.  If you have stitches or staples, you will need to have them taken out. Your doctor will often want to do this in 1 to 2 weeks. If the doctor used skin glue, the glue will fall off on its own.  What drugs may be needed?  The doctor may order drugs to:  Help with pain  Prevent infection  Soften stools  Will physical activity be limited?  Be sure to walk 3 to 4 times each day. Try to walk a little longer each day.  You may often return to work in 1 to 2 weeks. Talk with your doctor about how much you can lift.  You may need to rest for a while. Talk to your doctor if you run, work out, or play sports.  Do light household chores only, such as washing dishes or helping with meals.  What problems could happen?  Infection  Bleeding  Wound opens or has more drainage  Hernia comes back  When do I need to call the  doctor?  Signs of infection. These include a fever of 100.4°F (38°C) or higher, chills, pain with passing urine, or wound that will not heal.  Signs of wound infection. These include swelling, redness, warmth around the wound; too much pain when touched; yellowish, greenish, or bloody discharge; foul smell coming from the cut site; cut site opens up.  Continuous bleeding from the cut site.  Bulging at the cut site where the surgery was done.  No bowel movement by 2 to 3 days after surgery.  Continuous vomiting or severe abdominal pain and swelling.  Pain gets worse and does not improve when drugs are taken.  Teach Back: Helping You Understand  The Teach Back Method helps you understand the information we are giving you. After you talk with the staff, tell them in your own words what you learned. This helps to make sure the staff has described each thing clearly. It also helps to explain things that may have been confusing. Before going home, make sure you can do these:  I can tell you about my surgery.  I can tell you how to care for my cut site.  I can tell you what I will do if I have swelling, redness, or warmth around my wound.  Where can I learn more?  [employer]  https://www.facs.org/~/media/files/education/[email or web address]/[email or web address]_hernia.ashx  [email or web address]org  [email or web address]://[email or web address].org/[email or web address]  [facility]   Call Dr. Ivy's office at [phone] with any concerns.

## 2025-04-29 NOTE — ANESTHESIA POSTPROCEDURE EVALUATION
Patient: Anibal Person    Procedure Summary       Date: 04/29/25 Room / Location: [facility] 02 / [facility]    Anesthesia Start: 1014 Anesthesia Stop: 1134    Procedure: REPAIR, HERNIA, INGUINAL (Right: Groin) Diagnosis:       Right inguinal hernia      (Right inguinal hernia [K40.90])    Surgeons: Jose Ivy MD Responsible Provider: FERN Swartz    Anesthesia Type: general ASA Status: 2            Anesthesia Type: general    Vitals Value Taken Time   /97 04/29/25 11:30   Temp 36.7 °C (98.1 °F) 04/29/25 11:25   Pulse 62 04/29/25 11:30   Resp 12 04/29/25 11:30   SpO2 100 % 04/29/25 11:30       Anesthesia Post Evaluation    Patient location during evaluation: PACU  Patient participation: complete - patient participated  Level of consciousness: awake and alert  Pain score: 0  Pain management: adequate  Multimodal analgesia pain management approach  Airway patency: patent  Cardiovascular status: acceptable  Respiratory status: acceptable and room air  Hydration status: acceptable  Postoperative Nausea and Vomiting: none        There were no known notable events for this encounter.

## 2025-04-30 ASSESSMENT — PAIN SCALES - GENERAL: PAINLEVEL_OUTOF10: 0 - NO PAIN

## 2025-05-07 LAB
LABORATORY COMMENT REPORT: NORMAL
PATH REPORT.FINAL DX SPEC: NORMAL
PATH REPORT.GROSS SPEC: NORMAL
PATH REPORT.RELEVANT HX SPEC: NORMAL
PATH REPORT.TOTAL CANCER: NORMAL

## 2025-06-02 ENCOUNTER — APPOINTMENT (OUTPATIENT)
Dept: SURGERY | Facility: CLINIC | Age: 55
End: 2025-06-02
Payer: COMMERCIAL

## 2025-06-02 VITALS
HEIGHT: 73 IN | BODY MASS INDEX: 27.3 KG/M2 | DIASTOLIC BLOOD PRESSURE: 76 MMHG | TEMPERATURE: 98 F | SYSTOLIC BLOOD PRESSURE: 180 MMHG | HEART RATE: 83 BPM | WEIGHT: 206 LBS

## 2025-06-02 DIAGNOSIS — Z98.890 STATUS POST RIGHT INGUINAL HERNIORRHAPHY: Primary | ICD-10-CM

## 2025-06-02 DIAGNOSIS — Z87.19 STATUS POST RIGHT INGUINAL HERNIORRHAPHY: Primary | ICD-10-CM

## 2025-06-02 PROCEDURE — 3008F BODY MASS INDEX DOCD: CPT | Performed by: SURGERY

## 2025-06-02 PROCEDURE — 1036F TOBACCO NON-USER: CPT | Performed by: SURGERY

## 2025-06-02 PROCEDURE — 99024 POSTOP FOLLOW-UP VISIT: CPT | Performed by: SURGERY

## 2025-06-02 NOTE — PROGRESS NOTES
Patient ID: Anibal Person is a 54 y.o. male.  Following up after right inguinal herniorrhaphy.      Objective   Physical Exam  Constitutional:       Appearance: Normal appearance.   Abdominal:      Palpations: Abdomen is soft. There is no mass.      Tenderness: There is no abdominal tenderness. There is no guarding.      Hernia: No hernia is present.     Nicely healed incision    Problem List Items Addressed This Visit       Status post right inguinal herniorrhaphy - Primary        Assessment/Plan   Doing well after right inguinal herniorrhaphy.  We will continue to follow you within the [facility].  I will follow-up with you as needed

## 2025-06-02 NOTE — PATIENT INSTRUCTIONS
You have done well since your hernia operation.  The burning should resolve with time.  We will continue to follow you within the [facility].  I will see you as needed

## (undated) DEVICE — SUTURE, VICRYL, 0, 27 IN, UR-6, VIOLET

## (undated) DEVICE — 00000 VISIT COUNTER

## (undated) DEVICE — DRAPE PACK, GENERAL, CUSTOM, GENEVA

## (undated) DEVICE — SCRUB, APLICARE, 4OZ

## (undated) DEVICE — SOLUTION, IRRIGATION, SODIUM CHLORIDE 0.9%, 1000 ML, POUR BOTTLE

## (undated) DEVICE — SUTURE, SILK, 2-0, TIES, 12-30 IN, BLACK

## (undated) DEVICE — GLOVE, SURGICAL, PROTEXIS PI , 7.0, PF, LF

## (undated) DEVICE — DRAIN, PENROSE, XRAY, 1/4 X 18 IN, LF

## (undated) DEVICE — APPLICATOR, CHLORAPREP, W/ORANGE TINT, 26ML

## (undated) DEVICE — SUTURE, PROLENE, 0, 30 IN, CT-2, BLUE

## (undated) DEVICE — DRAPE, INCISE, ANTIMICROBIAL, IOBAN 2, 13 X 13 IN, DISPOSABLE, STERILE

## (undated) DEVICE — TRAY, DRY PREP, PREMIUM

## (undated) DEVICE — SOLUTION, PREP, PVP IODINE, FLIP TOP, 4OZ

## (undated) DEVICE — PAD, GROUNDING, ELECTROSURGICAL, W/9 FT CABLE, POLYHESIVE II, ADULT, LF

## (undated) DEVICE — Device

## (undated) DEVICE — GLOVE, SURGICAL, PROTEXIS PI BLUE W/NEUTHERA, 7.0, PF, LF

## (undated) DEVICE — SUTURE, MONOCRYL, 4-0, 18 IN, PS2, UNDYED

## (undated) DEVICE — SOLUTION, IRRIGATION, STERILE WATER, 1000 ML, POUR BOTTLE